# Patient Record
Sex: FEMALE | Race: BLACK OR AFRICAN AMERICAN | NOT HISPANIC OR LATINO | Employment: UNEMPLOYED | URBAN - METROPOLITAN AREA
[De-identification: names, ages, dates, MRNs, and addresses within clinical notes are randomized per-mention and may not be internally consistent; named-entity substitution may affect disease eponyms.]

---

## 2021-06-15 ENCOUNTER — TRANSFERRED RECORDS (OUTPATIENT)
Dept: HEALTH INFORMATION MANAGEMENT | Facility: CLINIC | Age: 26
End: 2021-06-15

## 2021-07-12 ENCOUNTER — TRANSFERRED RECORDS (OUTPATIENT)
Dept: HEALTH INFORMATION MANAGEMENT | Facility: CLINIC | Age: 26
End: 2021-07-12

## 2021-08-23 ENCOUNTER — TRANSFERRED RECORDS (OUTPATIENT)
Dept: HEALTH INFORMATION MANAGEMENT | Facility: CLINIC | Age: 26
End: 2021-08-23

## 2024-01-29 ENCOUNTER — MEDICAL CORRESPONDENCE (OUTPATIENT)
Dept: HEALTH INFORMATION MANAGEMENT | Facility: CLINIC | Age: 29
End: 2024-01-29
Payer: COMMERCIAL

## 2024-01-29 ENCOUNTER — TRANSCRIBE ORDERS (OUTPATIENT)
Dept: OTHER | Age: 29
End: 2024-01-29

## 2024-01-29 DIAGNOSIS — R63.4 WEIGHT LOSS: Primary | ICD-10-CM

## 2024-02-26 ENCOUNTER — HOSPITAL ENCOUNTER (OUTPATIENT)
Dept: NUTRITION | Facility: HOSPITAL | Age: 29
Discharge: HOME | End: 2024-02-26
Payer: COMMERCIAL

## 2024-02-26 DIAGNOSIS — R63.4 WEIGHT LOSS: ICD-10-CM

## 2024-02-26 PROCEDURE — 97802 MEDICAL NUTRITION INDIV IN: CPT | Mod: TEL,95 | Performed by: DIETITIAN, REGISTERED

## 2024-02-26 NOTE — PROGRESS NOTES
"[facility]  Medical Nutrition Therapy    Visit Type: Initial Assessment    Adeola Barrett, 28 year old referred by Edmundo Dubois MD for MNT related to R63.4 (ICD-10-CM) - Weight loss  \"Having trouble gaining weight\"    Virtual Visit Details    Type of service:  Telephone Visit   Phone call duration: 55 minutes   Originating Location (pt. Location): Home    Distant Location (provider location):  On-site     Nutrition Assessment:  Anthropometrics  Height:1.68 cm (5' 6\")        BMI: 19.9     Weight Status:  Normal BMI   Weight: 56 kg (123.2 lb)      UBW: 160 lb       IBW: 59 kg (130 lb) IBW %: 95%        Weight History:   -4 years ago weighed 160 lb and lost weight.   -Monitors her weight at home.   Weight 59 kg (130 lb) 04/12/2023 9:22 AM CDT     Goal Weight:   Patient would like to be at 160 lb. 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     Nutrition History    PMH:   -Having trouble eating   There was a period of 3 months where she was really sick, got nauseous, and threw-up. Couldn't even tolerate water. This happened 2-3 times over the past 1 year.   -Has a poor appetite. Cooks but can't eat the food because she feels so sick.   -Eats snacks but can't eat meals because her stomach gets upset. Eats every few hours. She gains weight up to 130-135 lb when she follows this diet and then gets sick and weight drops down to 110-111 lb. The last time she was 110 lb was in May ' 2023 she reports.  -Has a 9 year old son.     Labs: reviewed      Meds: She reports that she's not taking Remeron, which was prescribed to her in 11/2023 by LUCY Mackey. She never picked up the prescription.      famotidine (PEPCID) 20 mg tablet, Take 20 mg by mouth, Disp: , Rfl:      mirtazapine (REMERON) 15 mg tablet, Take 0.5 Tablets by mouth nightly at bedtime, Disp: 30 Tablet, Rfl: 1     polyethylene glycol, PEG, 3350 (GLYCOLAX) 17 gram/dose powder, Take 17 g by mouth once daily, Disp: 238 g, " Rfl: 0     sennosides-docusate sodium (OBIE-COLACE) 8.6-50 mg per tablet, Take 1 Tablet by mouth once daily as needed for constipation, Disp: 60 Tablet, Rfl: 1     ondansetron (ZOFRAN-ODT) 4 mg disintegrating tablet, Place 1 Tablet under the tongue every 8 (eight) hours if needed for Nausea/Vomiting, Disp: , Rfl:     Supplements: reviewed      Social/Environmental:   -average sleep per night: 4-6 hrs   -level of stress: Feels very stressed most days. Gets emotional and raises her voice. Feels exhausted from being sick. Has seen a therapist for talk therapy twice and she did not enjoy the experience. She didn't feel listened to or understood. She felt judged. She doesn't feel that her mental health is good.  -Meditates for 40 min every day while listening to messages from the [employer]       Food Record - wakes up at 5 am. Does yoga and exercises.   Breakfast: Water. [employer] (turkey or eggs), tea. Sleeps and wakes up at 2:00 pm.   Lunch: Tortilla with chicken and salad (eats half of it), salad, cucumber, chicken, turkey, glass of juice  Cookies and biscuits, sometimes rice or oatmeal   Dinner: Skips. Drinks a lot of water. Doesn't have an appetite. Sometimes soup made by her mother (goat and veggies)   Snacks: Grapefruit, apples, sometimes granola bars made from peanut butter and honey, yogurt sometimes   Beverages: Water (2 liters per day), a lot of homemade juice, no soda or sports drinks, drinks tea with ginger  -Doesn't drink cows but drinks oat milk   -Take-out never   -Works the overnight shift.   -When she doesn't feel well, all she can tolerate is liquid - orange juice, watermelon juice, electrolytes. Avoids milk.   -Cooks with olive oil and avocado oil, eats avocado   -Hasn't tried drinking nutrition supplements in the past.       Nutritional Details:   -Food allergies: none  -Food intolerances: none  -Food sensitivities: none  -GI concerns: Nausea, Vomiting and Bloating  -Appetite: low  -Pace of eating:  slow-moderate   -Role of cooking: self  -Role of food shopping: self      Physical Activity:  -On her feet at work walking around, lifting patients   -Uses the gym at her apartment sometimes and runs 2 times per week for 60 min      ASSESSED MALNUTRITION STATUS  % Weight Loss:  None noted  % Intake:  No decreased intake noted  Subcutaneous Fat Loss:  Unable to determine  Loss of Muscle Mass:  Unable to determine  Fluid Retention:  None noted      Malnutrition Diagnosis:  Patient does not meet two of the above criteria necessary for diagnosing malnutrition      Nutrition Diagnosis:  Unintended weight loss related to decreased appetite as evidenced by report of poor appetite, frequent episodes of nausea and vomiting, 81%UBW, and report of difficulty gaining weight.      Nutrition Intervention:  Nutrition Prescription Summary: MNT for Weight Gain     Nutrition Education (Content):  -Educated patient on nutrition therapy for weight gain.   -Recommended eating 6 small meals per day instead of three large ones, and always including a source of protein with each mini meal, plus added fats for additional calories.   -Discussed ways to add calories and protein to the diet. For more protein, consume meats, eggs, fish, full-fat dairy products, legumes such as beans, peas, and lentils, quinoa, couscous, soy including edamame and tofu, nuts and seeds, peanut butter or sunbutter, and dry milk powder or whey protein powder.   -For more calories, add butter, oils, mayonnaise, avocados, sour cream, any type of cheese, sweetened condensed milk, whole milk, half & half, ice cream, and gravy  -Recommended trying ONS such as Ensure Plus or Boost Plus.  -Explained ways to add these high protein and high calorie foods to meals. High Calorie/High Recipe book provided. Emphasized trying milk shakes or smoothies for a variety of flavors (add ONS to shakes as well).  -Encouraged patient to pick-up the prescription placed by her PCP for  Remeron.     Emailed/mailed information discussed including nutrition handouts to patient.       Nutrition Prescription: Macronutrient and Micronutrient details   Dosing weight: Current wt (59 kg)  Energy: 9307-6889 kcals/day (30-35 Kcal/Kg)    Justification:  (repletion and weight gain goal)   Protein: 59-71 g Pro/day (1-1.2 g pro/Kg)    Justification:  (Repletion and weight gain goal)   Fluid: 5399-5214 mL/day   (1 mL/Kcal)     Justification:  (maintenance)   Fiber:     Women (19-50 years): 25 grams per day          Carbohydrate: 45-65% kcal   <25 g added sugar/day       Fat: 20-35% kcal  <7% kcal from saturated fat   Micronutrient: DRI  <2,300 mg sodium/day            Vitamin and Mineral Supplements: MVT+M       Patient Engagement:   Assessed learning needs and learning preference: Yes.  Teaching Method(s) used: Explanation    Nutrition Education (Application):  a)  Discussed current eating plans / recommended alternative food choices    b)  Patient verbalizes understanding of diet by asking questions      Anticipate >50% compliance   Stage of Change:  preparation      Nutrition Goals:  1) Try consuming more dairy foods for the added protein: [employer]ik[employer] Pro yogurt, cottage cheese, sliced cheese, cheese sticks, or shredded cheese, nuts, seeds, peanut butter, legumes, meats, eggs, etc.   2) Start drinking 8 oz Ensure or Boost daily as a snack   3) Add whey protein powder to your juice every day   4) Focus on getting in 6 mini meals per day - make sure you have a protein and fat source with each of these meals     Nutrition Follow Up / Monitoring:   Weight, PO intake, PA, sx of nausea/vomiting      Nutrition Recommendations:  Patient to follow-up with RD in 6 weeks.  Patient has RD contact information to call/email if needed.      Start time: 11:00  End time: 11:55    Total Time Duration: 55 min      Cande Esquivel MS, RD, LD, [facility]  Clinical Dietitian  [phone]

## 2024-04-08 ENCOUNTER — TELEPHONE (OUTPATIENT)
Dept: NUTRITION | Facility: HOSPITAL | Age: 29
End: 2024-04-08
Payer: COMMERCIAL

## 2024-04-08 NOTE — PROGRESS NOTES
Nutrition Note    Patient was a no-show for her telephone nutrition therapy follow-up visit today. Left a detailed voicemail and did not receive a call back.     Cande Esquivel MS, RD, LD, [facility]  Clinical Dietitian  [phone]

## 2024-06-21 ENCOUNTER — OFFICE VISIT (OUTPATIENT)
Dept: FAMILY MEDICINE | Facility: CLINIC | Age: 29
End: 2024-06-21
Payer: COMMERCIAL

## 2024-06-21 ENCOUNTER — TELEPHONE (OUTPATIENT)
Dept: GASTROENTEROLOGY | Facility: CLINIC | Age: 29
End: 2024-06-21

## 2024-06-21 VITALS
OXYGEN SATURATION: 99 % | DIASTOLIC BLOOD PRESSURE: 93 MMHG | RESPIRATION RATE: 18 BRPM | HEIGHT: 66 IN | BODY MASS INDEX: 18.23 KG/M2 | WEIGHT: 113.4 LBS | TEMPERATURE: 98.2 F | SYSTOLIC BLOOD PRESSURE: 123 MMHG | HEART RATE: 84 BPM

## 2024-06-21 DIAGNOSIS — Z22.7 TB LUNG, LATENT: ICD-10-CM

## 2024-06-21 DIAGNOSIS — R10.13 EPIGASTRIC PAIN: Primary | ICD-10-CM

## 2024-06-21 DIAGNOSIS — Z11.59 NEED FOR HEPATITIS C SCREENING TEST: ICD-10-CM

## 2024-06-21 DIAGNOSIS — R63.6 LOW WEIGHT: ICD-10-CM

## 2024-06-21 DIAGNOSIS — Z11.59 NEED FOR HEPATITIS B SCREENING TEST: ICD-10-CM

## 2024-06-21 DIAGNOSIS — K59.09 OTHER CONSTIPATION: ICD-10-CM

## 2024-06-21 DIAGNOSIS — A04.8 H. PYLORI INFECTION: ICD-10-CM

## 2024-06-21 LAB
ALBUMIN SERPL BCG-MCNC: 4.4 G/DL (ref 3.5–5.2)
ALP SERPL-CCNC: 50 U/L (ref 40–150)
ALT SERPL W P-5'-P-CCNC: 6 U/L (ref 0–50)
ANION GAP SERPL CALCULATED.3IONS-SCNC: 11 MMOL/L (ref 7–15)
AST SERPL W P-5'-P-CCNC: 20 U/L (ref 0–45)
BILIRUB SERPL-MCNC: 0.6 MG/DL
BUN SERPL-MCNC: 5.1 MG/DL (ref 6–20)
CALCIUM SERPL-MCNC: 9.2 MG/DL (ref 8.6–10)
CHLORIDE SERPL-SCNC: 101 MMOL/L (ref 98–107)
CREAT SERPL-MCNC: 0.57 MG/DL (ref 0.51–0.95)
DEPRECATED HCO3 PLAS-SCNC: 22 MMOL/L (ref 22–29)
EGFRCR SERPLBLD CKD-EPI 2021: >90 ML/MIN/1.73M2
ERYTHROCYTE [DISTWIDTH] IN BLOOD BY AUTOMATED COUNT: 13.1 % (ref 10–15)
GLUCOSE SERPL-MCNC: 95 MG/DL (ref 70–99)
HCT VFR BLD AUTO: 38.1 % (ref 35–47)
HGB BLD-MCNC: 12.7 G/DL (ref 11.7–15.7)
LIPASE SERPL-CCNC: 16 U/L (ref 13–60)
MCH RBC QN AUTO: 28.6 PG (ref 26.5–33)
MCHC RBC AUTO-ENTMCNC: 33.3 G/DL (ref 31.5–36.5)
MCV RBC AUTO: 86 FL (ref 78–100)
PLATELET # BLD AUTO: 292 10E3/UL (ref 150–450)
POTASSIUM SERPL-SCNC: 4.1 MMOL/L (ref 3.4–5.3)
PROT SERPL-MCNC: 7.3 G/DL (ref 6.4–8.3)
RBC # BLD AUTO: 4.44 10E6/UL (ref 3.8–5.2)
SODIUM SERPL-SCNC: 134 MMOL/L (ref 135–145)
WBC # BLD AUTO: 5.7 10E3/UL (ref 4–11)

## 2024-06-21 PROCEDURE — 86803 HEPATITIS C AB TEST: CPT | Performed by: STUDENT IN AN ORGANIZED HEALTH CARE EDUCATION/TRAINING PROGRAM

## 2024-06-21 PROCEDURE — 99204 OFFICE O/P NEW MOD 45 MIN: CPT | Mod: GC | Performed by: STUDENT IN AN ORGANIZED HEALTH CARE EDUCATION/TRAINING PROGRAM

## 2024-06-21 PROCEDURE — 83690 ASSAY OF LIPASE: CPT | Performed by: STUDENT IN AN ORGANIZED HEALTH CARE EDUCATION/TRAINING PROGRAM

## 2024-06-21 PROCEDURE — 85027 COMPLETE CBC AUTOMATED: CPT | Performed by: STUDENT IN AN ORGANIZED HEALTH CARE EDUCATION/TRAINING PROGRAM

## 2024-06-21 PROCEDURE — 86706 HEP B SURFACE ANTIBODY: CPT | Performed by: STUDENT IN AN ORGANIZED HEALTH CARE EDUCATION/TRAINING PROGRAM

## 2024-06-21 PROCEDURE — 87340 HEPATITIS B SURFACE AG IA: CPT | Performed by: STUDENT IN AN ORGANIZED HEALTH CARE EDUCATION/TRAINING PROGRAM

## 2024-06-21 PROCEDURE — 36415 COLL VENOUS BLD VENIPUNCTURE: CPT | Performed by: STUDENT IN AN ORGANIZED HEALTH CARE EDUCATION/TRAINING PROGRAM

## 2024-06-21 PROCEDURE — 80053 COMPREHEN METABOLIC PANEL: CPT | Performed by: STUDENT IN AN ORGANIZED HEALTH CARE EDUCATION/TRAINING PROGRAM

## 2024-06-21 RX ORDER — PROCHLORPERAZINE MALEATE 10 MG
10 TABLET ORAL EVERY 6 HOURS PRN
Qty: 90 TABLET | Refills: 0 | Status: SHIPPED | OUTPATIENT
Start: 2024-06-21

## 2024-06-21 NOTE — PROGRESS NOTES
Assessment & Plan     Epigastric pain  (primary encounter diagnosis)  Other constipation  Low weight  Coming into clinic reporting epigastric pain, emesis, poor appetite, weight loss for the past 4 years.  Has had to go to the ED multiple times.  CT abdomen pelvis in the past year was unremarkable.  Recently completed H. pylori treatment.  On exam she is thin appearing, has tenderness to palpation at the epigastric region. DDx  H. pylori, ulcer, gastritis, chronic pancreatitis, possible malignancy  Plan:   Adult GI  Referral - Consult Only,   CBC with platelets, Comprehensive metabolic panel, Lipase  prochlorperazine (COMPAZINE) 10 MG         Tablet PRN  Encouraged to increase oral hydration, eating in small portion sizes      H. pylori infection  Hx of H-plyori in the past. Most recent breath test was positive.  Patient reported she completed treatment with 3 pills end of May 2024  Plan:   Helicobacter pylori Antigen Stool for treatment response/eradication       Need for hepatitis B screening test  Plan: Hepatitis B Surface Antibody, Hepatitis B         Surface Ag           Need for hepatitis C screening test  Plan: Hepatitis C Screen Reflex to HCV RNA Quant and         Genotype    TB lung, latent  Patient denied symptoms of shortness of breath, chest pain, fevers/chills, night sweats. Reports to having sweaty hands especially during episodes of emesis. Chest x-ray negative. Reports not being treated in the past.  Plan:   Will follow-up in clinic for latent TB treatment    Return in about 2 weeks (around 7/5/2024).    Estella Mir is a 28 year old, presenting for the following health issues:  other (Stomach issues can not eat)      6/21/2024    11:05 AM   Additional Questions   Roomed by ave   Accompanied by self         6/21/2024    Information    services provided? No        HPI  Pain started 4 years ago.   Randomly has pain. Pain starts in the epigastric region radiating  "to the back. Has globus sensation in the chest  Has nausea and vomiting. Green/yellow emesis   The longest time without pain was 6 months   But multiple episodes in a month   Goes to the ED frequently  Zofran does not help   Her appetite  Constipated goes every 3-4 days, miralax does not help    Treated for Hpyloria. Finished in May. 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  Has never been treated before.  [place]  No recent travels.      Review of Systems  Constitutional, HEENT, cardiovascular, pulmonary, gi and gu systems are negative, except as otherwise noted.      Objective    BP (!) 123/93   Pulse 84   Temp 98.2  F (36.8  C) (Oral)   Resp 18   Ht 1.676 m (5' 6\")   Wt 51.4 kg (113 lb 6.4 oz)   LMP 06/13/2024 (Exact Date)   SpO2 99%   BMI 18.30 kg/m    Body mass index is 18.3 kg/m .  Physical Exam   GENERAL: Thin appearing.  Appears uncomfortable.    HEENT: Normal oropharynx  NECK: no adenopathy, no asymmetry, masses, or scars  RESP: lungs clear to auscultation - no rales, rhonchi or wheezes  CV: regular rate and rhythm, normal S1 S2, no S3 or S4, no murmur, click or rub, no peripheral edema  ABDOMEN: soft, tenderness to light palpation at the epigastric region, no guarding, no rebound tenderness.  bowel sounds normal    Precepted with Dr. Jose Bell MD PGY2  [facility]   "

## 2024-06-21 NOTE — PROGRESS NOTES
"Preceptor Attestation:  Vitals:    06/21/24 1106 06/21/24 1109   BP: (!) 122/90 (!) 123/93   BP Location: Left arm    Patient Position: Sitting    Cuff Size: Adult Regular    Pulse: 84    Resp: 18    Temp: 98.2  F (36.8  C)    TempSrc: Oral    SpO2: 99%    Weight: 51.4 kg (113 lb 6.4 oz)    Height: 1.676 m (5' 6\")           I discussed the patient with the resident and evaluated the patient in person. I have verified the content of the note, which accurately reflects my assessment of the patient and the plan of care.   Supervising Physician:  Denita Garcia MD    "

## 2024-06-21 NOTE — TELEPHONE ENCOUNTER
Requested medical records from [facility]      From: Joslyn Ku RN   Sent: 6/21/2024  12:45 PM CDT   To: [facility] Support Pool   Subject: record help                                      Hi There,   This person has seen [facility] in the past.  Can we please get records for the last 3 years to show 2nd opinion.     Thanks :)   Tamir

## 2024-06-22 LAB
HBV SURFACE AB SERPL IA-ACNC: >1000 M[IU]/ML
HBV SURFACE AB SERPL IA-ACNC: REACTIVE M[IU]/ML
HBV SURFACE AG SERPL QL IA: NONREACTIVE
HCV AB SERPL QL IA: NONREACTIVE

## 2024-07-21 ENCOUNTER — HEALTH MAINTENANCE LETTER (OUTPATIENT)
Age: 29
End: 2024-07-21

## 2025-08-10 ENCOUNTER — HEALTH MAINTENANCE LETTER (OUTPATIENT)
Age: 30
End: 2025-08-10